# Patient Record
Sex: MALE | Race: BLACK OR AFRICAN AMERICAN | URBAN - METROPOLITAN AREA
[De-identification: names, ages, dates, MRNs, and addresses within clinical notes are randomized per-mention and may not be internally consistent; named-entity substitution may affect disease eponyms.]

---

## 2022-09-23 ENCOUNTER — OFFICE VISIT (OUTPATIENT)
Dept: URBAN - METROPOLITAN AREA CLINIC 74 | Facility: CLINIC | Age: 77
End: 2022-09-23
Payer: MEDICARE

## 2022-09-23 VITALS
TEMPERATURE: 96.5 F | WEIGHT: 224 LBS | HEART RATE: 71 BPM | SYSTOLIC BLOOD PRESSURE: 182 MMHG | DIASTOLIC BLOOD PRESSURE: 80 MMHG | BODY MASS INDEX: 33.18 KG/M2 | HEIGHT: 69 IN

## 2022-09-23 DIAGNOSIS — Z79.02 LONG TERM CURRENT USE OF ANTITHROMBOTICS/ANTIPLATELETS: ICD-10-CM

## 2022-09-23 DIAGNOSIS — Z79.01 LONG TERM CURRENT USE OF ANTICOAGULANT THERAPY: ICD-10-CM

## 2022-09-23 DIAGNOSIS — D63.8 ANEMIA IN CHRONIC ILLNESS: ICD-10-CM

## 2022-09-23 PROCEDURE — 99203 OFFICE O/P NEW LOW 30 MIN: CPT | Performed by: PHYSICIAN ASSISTANT

## 2022-09-23 RX ORDER — ZINC GLUCONATE 50 MG
1 TABLET TABLET ORAL
Status: ACTIVE | COMMUNITY

## 2022-09-23 RX ORDER — ATORVASTATIN CALCIUM 40 MG/1
1 TABLET TABLET, FILM COATED ORAL ONCE A DAY
Status: ACTIVE | COMMUNITY

## 2022-09-23 RX ORDER — ASCORBIC ACID 1000 MG
1 TABLET TABLET ORAL ONCE A DAY
Status: ACTIVE | COMMUNITY

## 2022-09-23 RX ORDER — HYDRALAZINE HYDROCHLORIDE 100 MG/1
AS DIRECTED TABLET, FILM COATED ORAL
Status: ACTIVE | COMMUNITY

## 2022-09-23 RX ORDER — PANTOPRAZOLE SODIUM 40 MG/1
1 TABLET TABLET, DELAYED RELEASE ORAL
Status: ACTIVE | COMMUNITY

## 2022-09-23 RX ORDER — CARVEDILOL 12.5 MG/1
1 TABLET WITH FOOD TABLET, FILM COATED ORAL
Status: ACTIVE | COMMUNITY

## 2022-09-23 RX ORDER — DOCUSATE SODIUM 100 MG/1
1 CAPSULE AS NEEDED CAPSULE ORAL
Status: ACTIVE | COMMUNITY

## 2022-09-23 RX ORDER — CLOPIDOGREL 75 MG/1
1 TABLET TABLET, FILM COATED ORAL ONCE A DAY
Status: ACTIVE | COMMUNITY

## 2022-09-23 RX ORDER — AMLODIPINE BESYLATE 5 MG/1
1 TABLET TABLET ORAL ONCE A DAY
Status: ACTIVE | COMMUNITY

## 2022-09-23 RX ORDER — RIVAROXABAN 20 MG/1
1 TABLET TABLET, FILM COATED ORAL
Status: ACTIVE | COMMUNITY

## 2022-09-23 RX ORDER — DICLOFENAC SODIUM 10 MG/G
AS DIRECTED GEL TOPICAL
Status: ACTIVE | COMMUNITY

## 2022-09-23 RX ORDER — TAMSULOSIN HYDROCHLORIDE 0.4 MG/1
1 CAPSULE CAPSULE ORAL ONCE A DAY
Status: ACTIVE | COMMUNITY

## 2022-09-23 NOTE — HPI-TODAY'S VISIT:
The patient is 77-year-old male with complicated medical history and extended hospitalization this year with known history of hypertension, hyperlipidemia, diabetic, benign prostatic hypertrophy, status post prostatectomy in 2016, coronary atherosclerotic heart disease, status post JOHN to RCA in 03/2022, long-term use of antiplatelet therapy Plavix, bilateral pulmonary embolism, status post left embolectomy in 03/2022, left DVT, on long-term anticoagulation therapy Xarelto, history of delirium, encephalopathy, sacral pressure ulcer stage III, rhabdomyolysis, transaminitis, protein calorie malnutrition, anemia of chronic disease, chronic kidney infection, obesity, and osteoarthritis presented to our clinic today for further evaluation of anemia. He has no GI issues.   -- The patient denies dyspepsia, dysphagia, odynophagia, hemoptysis, hematemesis, nausea, vomiting, regurgitation, melena, constipation, diarrhea, abdominal pain, hematochezia, fever, chills, chest pain, SOB, or any other GI complaints today. -- The patient denies ETOH, Tobacco, and Illicit drug use. -- The patient is up to date with Flu, Pneumonia, and COVID vaccine 3/3.  -- Labs on 08/22/2022 Hgb 11.7 and Hct 37.0.

## 2022-09-24 LAB
% SATURATION: 30
ABSOLUTE BASOPHILS: 19
ABSOLUTE EOSINOPHILS: 141
ABSOLUTE LYMPHOCYTES: 1241
ABSOLUTE MONOCYTES: 813
ABSOLUTE NEUTROPHILS: 2486
BASOPHILS: 0.4
EOSINOPHILS: 3
FERRITIN: 37
FOLATE (FOLIC ACID), SERUM: >24
HEMATOCRIT: 35.3
HEMOGLOBIN: 11.5
IRON BINDING CAPACITY: 315
IRON, TOTAL: 95
LYMPHOCYTES: 26.4
MCH: 29.4
MCHC: 32.6
MCV: 90.3
MONOCYTES: 17.3
MPV: 10.6
NEUTROPHILS: 52.9
PLATELET COUNT: 181
RDW: 14
RED BLOOD CELL COUNT: 3.91
VITAMIN B12: 693
WHITE BLOOD CELL COUNT: 4.7

## 2022-10-10 ENCOUNTER — TELEPHONE ENCOUNTER (OUTPATIENT)
Dept: URBAN - METROPOLITAN AREA CLINIC 74 | Facility: CLINIC | Age: 77
End: 2022-10-10

## 2023-01-23 ENCOUNTER — OFFICE VISIT (OUTPATIENT)
Dept: URBAN - METROPOLITAN AREA CLINIC 74 | Facility: CLINIC | Age: 78
End: 2023-01-23
Payer: MEDICARE

## 2023-01-23 ENCOUNTER — WEB ENCOUNTER (OUTPATIENT)
Dept: URBAN - METROPOLITAN AREA CLINIC 74 | Facility: CLINIC | Age: 78
End: 2023-01-23

## 2023-01-23 VITALS
BODY MASS INDEX: 34.93 KG/M2 | SYSTOLIC BLOOD PRESSURE: 140 MMHG | DIASTOLIC BLOOD PRESSURE: 80 MMHG | HEIGHT: 69 IN | WEIGHT: 235.8 LBS | TEMPERATURE: 97.1 F | HEART RATE: 60 BPM

## 2023-01-23 DIAGNOSIS — I26.99 PULMONARY EMBOLISM, UNSPECIFIED CHRONICITY, UNSPECIFIED PULMONARY EMBOLISM TYPE, UNSPECIFIED WHETHER ACUTE COR PULMONALE PRESENT: ICD-10-CM

## 2023-01-23 DIAGNOSIS — I48.0 PAF (PAROXYSMAL ATRIAL FIBRILLATION): ICD-10-CM

## 2023-01-23 DIAGNOSIS — D63.8 ANEMIA IN CHRONIC ILLNESS: ICD-10-CM

## 2023-01-23 DIAGNOSIS — I82.503 CHRONIC DEEP VEIN THROMBOSIS (DVT) OF BOTH LOWER EXTREMITIES, UNSPECIFIED VEIN: ICD-10-CM

## 2023-01-23 DIAGNOSIS — Z79.02 LONG TERM CURRENT USE OF ANTITHROMBOTICS/ANTIPLATELETS: ICD-10-CM

## 2023-01-23 DIAGNOSIS — Z79.01 LONG TERM CURRENT USE OF ANTICOAGULANT THERAPY: ICD-10-CM

## 2023-01-23 PROCEDURE — 99214 OFFICE O/P EST MOD 30 MIN: CPT | Performed by: PHYSICIAN ASSISTANT

## 2023-01-23 RX ORDER — PANTOPRAZOLE SODIUM 40 MG/1
1 TABLET TABLET, DELAYED RELEASE ORAL
Status: ACTIVE | COMMUNITY

## 2023-01-23 RX ORDER — HYDRALAZINE HYDROCHLORIDE 100 MG/1
AS DIRECTED TABLET, FILM COATED ORAL
Status: ACTIVE | COMMUNITY

## 2023-01-23 RX ORDER — TAMSULOSIN HYDROCHLORIDE 0.4 MG/1
1 CAPSULE CAPSULE ORAL ONCE A DAY
Status: ACTIVE | COMMUNITY

## 2023-01-23 RX ORDER — ZINC GLUCONATE 50 MG
1 TABLET TABLET ORAL
Status: ON HOLD | COMMUNITY

## 2023-01-23 RX ORDER — ATORVASTATIN CALCIUM 40 MG/1
1 TABLET TABLET, FILM COATED ORAL ONCE A DAY
Status: ACTIVE | COMMUNITY

## 2023-01-23 RX ORDER — DICLOFENAC SODIUM 10 MG/G
AS DIRECTED GEL TOPICAL
Status: ON HOLD | COMMUNITY

## 2023-01-23 RX ORDER — ASCORBIC ACID 1000 MG
1 TABLET TABLET ORAL ONCE A DAY
Status: ACTIVE | COMMUNITY

## 2023-01-23 RX ORDER — AMLODIPINE BESYLATE 5 MG/1
1 TABLET TABLET ORAL ONCE A DAY
Status: ACTIVE | COMMUNITY

## 2023-01-23 RX ORDER — CARVEDILOL 12.5 MG/1
1 TABLET WITH FOOD TABLET, FILM COATED ORAL
Status: ACTIVE | COMMUNITY

## 2023-01-23 RX ORDER — RIVAROXABAN 20 MG/1
1 TABLET TABLET, FILM COATED ORAL
Status: ACTIVE | COMMUNITY

## 2023-01-23 RX ORDER — DOCUSATE SODIUM 100 MG/1
1 CAPSULE AS NEEDED CAPSULE ORAL
Status: ON HOLD | COMMUNITY

## 2023-01-23 RX ORDER — CLOPIDOGREL 75 MG/1
1 TABLET TABLET, FILM COATED ORAL ONCE A DAY
Status: ACTIVE | COMMUNITY

## 2023-01-23 NOTE — HPI-TODAY'S VISIT:
The patient is 77-year-old male with complicated medical history and extended hospitalization this year with known history of hypertension, hyperlipidemia, diabetic, benign prostatic hypertrophy, status post prostatectomy in 2016, coronary atherosclerotic heart disease, status post JOHN to RCA in 03/2022, long-term use of antiplatelet therapy Plavix, bilateral pulmonary embolism, status post left embolectomy in 03/2022, left DVT, on long-term anticoagulation therapy Xarelto, history of delirium, encephalopathy, sacral pressure ulcer stage III, rhabdomyolysis, transaminitis, protein calorie malnutrition, anemia of chronic disease, chronic kidney infection, obesity, and osteoarthritis presented to our clinic today for further evaluation of anemia. He has no GI issues.  He continues with Xarleto  and Plavix. He has PCP Dr. Porter that write his medications. He has no Cardiologist since he moved here. The patient was advised to return to our office in 03/2023 since he just started on anticoaulation and antiplatelet therapy in 03/2022 to be evaluated for procedures after clearnace.    -- The patient denies dyspepsia, dysphagia, odynophagia, hemoptysis, hematemesis, nausea, vomiting, regurgitation, melena, constipation, diarrhea, abdominal pain, hematochezia, fever, chills, chest pain, SOB, or any other GI complaints today. -- The patient denies ETOH, Tobacco, and Illicit drug use. -- The patient is up to date with Flu, Pneumonia, and COVID vaccine 3/3.

## 2023-01-24 LAB
ABSOLUTE BASOPHILS: 31
ABSOLUTE EOSINOPHILS: 143
ABSOLUTE LYMPHOCYTES: 1770
ABSOLUTE MONOCYTES: 944
ABSOLUTE NEUTROPHILS: 2213
BASOPHILS: 0.6
EOSINOPHILS: 2.8
HEMATOCRIT: 36.8
HEMOGLOBIN: 12.3
LYMPHOCYTES: 34.7
MCH: 29.8
MCHC: 33.4
MCV: 89.1
MONOCYTES: 18.5
MPV: 11.1
NEUTROPHILS: 43.4
PLATELET COUNT: 177
RDW: 13.2
RED BLOOD CELL COUNT: 4.13
WHITE BLOOD CELL COUNT: 5.1

## 2023-02-01 ENCOUNTER — WEB ENCOUNTER (OUTPATIENT)
Dept: URBAN - METROPOLITAN AREA CLINIC 74 | Facility: CLINIC | Age: 78
End: 2023-02-01

## 2023-02-25 ENCOUNTER — DASHBOARD ENCOUNTERS (OUTPATIENT)
Age: 78
End: 2023-02-25

## 2023-02-25 PROBLEM — 282825002: Status: ACTIVE | Noted: 2023-01-23

## 2023-02-25 PROBLEM — 305058001: Status: ACTIVE | Noted: 2022-09-21

## 2023-02-25 PROBLEM — 711150003: Status: ACTIVE | Noted: 2022-09-21

## 2023-02-25 PROBLEM — 132141000119106: Status: ACTIVE | Noted: 2023-01-23

## 2023-02-25 PROBLEM — 234347009: Status: ACTIVE | Noted: 2022-09-21

## 2023-04-10 ENCOUNTER — OFFICE VISIT (OUTPATIENT)
Dept: URBAN - METROPOLITAN AREA CLINIC 74 | Facility: CLINIC | Age: 78
End: 2023-04-10
Payer: MEDICARE

## 2023-04-10 ENCOUNTER — LAB OUTSIDE AN ENCOUNTER (OUTPATIENT)
Dept: URBAN - METROPOLITAN AREA CLINIC 74 | Facility: CLINIC | Age: 78
End: 2023-04-10

## 2023-04-10 VITALS
HEIGHT: 69 IN | HEART RATE: 78 BPM | TEMPERATURE: 97.2 F | WEIGHT: 241 LBS | SYSTOLIC BLOOD PRESSURE: 142 MMHG | BODY MASS INDEX: 35.7 KG/M2 | DIASTOLIC BLOOD PRESSURE: 78 MMHG

## 2023-04-10 DIAGNOSIS — I48.0 PAF (PAROXYSMAL ATRIAL FIBRILLATION): ICD-10-CM

## 2023-04-10 DIAGNOSIS — D63.8 ANEMIA IN CHRONIC ILLNESS: ICD-10-CM

## 2023-04-10 DIAGNOSIS — Z79.02 LONG TERM CURRENT USE OF ANTITHROMBOTICS/ANTIPLATELETS: ICD-10-CM

## 2023-04-10 DIAGNOSIS — I26.99 PULMONARY EMBOLISM, UNSPECIFIED CHRONICITY, UNSPECIFIED PULMONARY EMBOLISM TYPE, UNSPECIFIED WHETHER ACUTE COR PULMONALE PRESENT: ICD-10-CM

## 2023-04-10 DIAGNOSIS — I82.503 CHRONIC DEEP VEIN THROMBOSIS (DVT) OF BOTH LOWER EXTREMITIES, UNSPECIFIED VEIN: ICD-10-CM

## 2023-04-10 DIAGNOSIS — Z79.01 LONG TERM CURRENT USE OF ANTICOAGULANT THERAPY: ICD-10-CM

## 2023-04-10 PROCEDURE — 99213 OFFICE O/P EST LOW 20 MIN: CPT | Performed by: PHYSICIAN ASSISTANT

## 2023-04-10 RX ORDER — CARVEDILOL 12.5 MG/1
1 TABLET WITH FOOD TABLET, FILM COATED ORAL
Status: ACTIVE | COMMUNITY

## 2023-04-10 RX ORDER — PANTOPRAZOLE SODIUM 40 MG/1
1 TABLET TABLET, DELAYED RELEASE ORAL
Status: ACTIVE | COMMUNITY

## 2023-04-10 RX ORDER — ASCORBIC ACID 1000 MG
1 TABLET TABLET ORAL ONCE A DAY
Status: ACTIVE | COMMUNITY

## 2023-04-10 RX ORDER — ZINC GLUCONATE 50 MG
1 TABLET TABLET ORAL
Status: ON HOLD | COMMUNITY

## 2023-04-10 RX ORDER — RIVAROXABAN 20 MG/1
1 TABLET TABLET, FILM COATED ORAL
Status: ACTIVE | COMMUNITY

## 2023-04-10 RX ORDER — ATORVASTATIN CALCIUM 40 MG/1
1 TABLET TABLET, FILM COATED ORAL ONCE A DAY
Status: ACTIVE | COMMUNITY

## 2023-04-10 RX ORDER — DICLOFENAC SODIUM 10 MG/G
AS DIRECTED GEL TOPICAL
Status: ON HOLD | COMMUNITY

## 2023-04-10 RX ORDER — POLYETHYLENE GLYCOL 3350 17 G/17G
AS DIRECTED POWDER, FOR SOLUTION ORAL
Qty: 238 G | Refills: 0 | OUTPATIENT
Start: 2023-04-10 | End: 2023-04-11

## 2023-04-10 RX ORDER — CLOPIDOGREL 75 MG/1
1 TABLET TABLET, FILM COATED ORAL ONCE A DAY
Status: ACTIVE | COMMUNITY

## 2023-04-10 RX ORDER — TAMSULOSIN HYDROCHLORIDE 0.4 MG/1
1 CAPSULE CAPSULE ORAL ONCE A DAY
Status: ACTIVE | COMMUNITY

## 2023-04-10 RX ORDER — AMLODIPINE BESYLATE 5 MG/1
1 TABLET TABLET ORAL ONCE A DAY
Status: ACTIVE | COMMUNITY

## 2023-04-10 RX ORDER — DOCUSATE SODIUM 100 MG/1
1 CAPSULE AS NEEDED CAPSULE ORAL
Status: ON HOLD | COMMUNITY

## 2023-04-10 RX ORDER — HYDRALAZINE HYDROCHLORIDE 100 MG/1
AS DIRECTED TABLET, FILM COATED ORAL
Status: ACTIVE | COMMUNITY

## 2023-04-10 NOTE — HPI-TODAY'S VISIT:
The patient is 77-year-old male with complicated medical history and extended hospitalization this year with known history of hypertension, hyperlipidemia, diabetic, benign prostatic hypertrophy, status post prostatectomy in 2016, coronary atherosclerotic heart disease, status post JOHN to RCA in 03/2022, long-term use of antiplatelet therapy Plavix, bilateral pulmonary embolism, status post left embolectomy in 03/2022, left DVT, on long-term anticoagulation therapy Xarelto, history of delirium, encephalopathy, sacral pressure ulcer stage III, rhabdomyolysis, transaminitis, protein calorie malnutrition, anemia of chronic disease, chronic kidney infection, obesity, and osteoarthritis presented to our clinic today for further evaluation of anemia. The patient was advised to return to our office in 03/2023 since he just started on anticoaulation and antiplatelet therapy in 03/2022 to be evaluated for procedures after clearnace.  He is scheduled to see Dr. Tamayo on 04/13/2022 for anemia and he also seen Dr. Franks for right inguinal hernia and he is going to have surgery but not scheduled. No new GI issues today.   -- The patient denies dyspepsia, dysphagia, odynophagia, hemoptysis, hematemesis, nausea, vomiting, regurgitation, melena, constipation, diarrhea, abdominal pain, hematochezia, fever, chills, chest pain, SOB, or any other GI complaints today. -- The patient denies ETOH, Tobacco, and Illicit drug use. -- The patient is up to date with Flu, Pneumonia, and COVID vaccine 3/3.  Diagnostic Studies: -- Labs on 01/23/2023 as noted below.

## 2023-05-16 ENCOUNTER — TELEPHONE ENCOUNTER (OUTPATIENT)
Dept: URBAN - METROPOLITAN AREA CLINIC 74 | Facility: CLINIC | Age: 78
End: 2023-05-16

## 2023-05-18 ENCOUNTER — OFFICE VISIT (OUTPATIENT)
Dept: URBAN - METROPOLITAN AREA SURGERY CENTER 30 | Facility: SURGERY CENTER | Age: 78
End: 2023-05-18